# Patient Record
Sex: MALE | Race: BLACK OR AFRICAN AMERICAN | Employment: FULL TIME | URBAN - METROPOLITAN AREA
[De-identification: names, ages, dates, MRNs, and addresses within clinical notes are randomized per-mention and may not be internally consistent; named-entity substitution may affect disease eponyms.]

---

## 2021-08-26 ENCOUNTER — HOSPITAL ENCOUNTER (EMERGENCY)
Facility: CLINIC | Age: 21
Discharge: HOME | End: 2021-08-27
Attending: EMERGENCY MEDICINE | Admitting: EMERGENCY MEDICINE

## 2021-08-26 DIAGNOSIS — T40.2X1A OPIOID OVERDOSE, ACCIDENTAL OR UNINTENTIONAL, INITIAL ENCOUNTER (H): ICD-10-CM

## 2021-08-26 PROCEDURE — 99283 EMERGENCY DEPT VISIT LOW MDM: CPT

## 2021-08-26 ASSESSMENT — ENCOUNTER SYMPTOMS
HEADACHES: 0
ABDOMINAL PAIN: 0

## 2021-08-27 VITALS
TEMPERATURE: 99.3 F | OXYGEN SATURATION: 97 % | RESPIRATION RATE: 13 BRPM | HEART RATE: 54 BPM | DIASTOLIC BLOOD PRESSURE: 84 MMHG | SYSTOLIC BLOOD PRESSURE: 143 MMHG

## 2021-08-27 NOTE — PHARMACY-ADMISSION MEDICATION HISTORY
Pharmacy Medication History  Admission medication history interview status for the 8/26/2021  admission is complete. See EPIC admission navigator for prior to admission medications     Location of Interview: Phone  Medication history sources: Patient, Surescripts and Care Everywhere    Significant changes made to the medication list:  None    In the past week, patient estimated taking medication this percent of the time: greater than 90%    Additional medication history information:   Reported no prescription or OTC meds.    Medication reconciliation completed by provider prior to medication history? No    Time spent in this activity: 15 mins    Prior to Admission medications    Not on File       The information provided in this note is only as accurate as the sources available at the time of update(s)

## 2021-08-27 NOTE — ED TRIAGE NOTES
Pt was found sdleeping on the bathroom floor in a stall. Pt had a foil packet with unknown substance on it. Pt was given 12 mg of narcan. Pt was very combative but is calm at this time.

## 2021-08-27 NOTE — ED NOTES
Bed: [facility]  Expected date:   Expected time:   Means of arrival:   Comments:  When clean 535 overdose on hold

## 2021-08-27 NOTE — ED PROVIDER NOTES
History   Chief Complaint:  Drug Overdose     HPI   Edgar Bains is a 21 year old male who presents with drug overdose. Edgar states he snorted oxycodone prior to his flight. He was found in the airport bathroom stall unresponsive by a bystander. EMS was called and he received 12 mg intranasal Narcan. He awoke combative but calmed down. His flight was rescheduled for 0900 tomorrow morning and will hail a cab back to the airport. He has no concerns today such as headaches, chest pain, or abdominal pain. He denies suicidal ideation or intentional ingestion of other substances. He denies alcohol use.     Review of Systems   Cardiovascular: Negative for chest pain.   Gastrointestinal: Negative for abdominal pain.   Neurological: Negative for headaches.   Psychiatric/Behavioral: Negative for self-injury and suicidal ideas.   All other systems reviewed and are negative.    Allergies:  No known drug allergies    Medications:  The patient is not currently taking any prescribed medications.    Past Medical History:    The patient denies any significant past medical history aside from appendicitis which he has surgery performed for.     Past Surgical History:    Appendectomy    Social History:  Arrives to the emergency department via EMS from the airport.     Physical Exam     Patient Vitals for the past 24 hrs:   BP Temp Pulse Resp SpO2   08/26/21 2103 (!) 148/84 99.3  F (37.4  C) 72 16 100 %       Physical Exam    General:  Sitting on bed.   HENT:  No obvious trauma to head  Right Ear:  External ear normal.   Left Ear:  External ear normal.   Nose:  Nose normal.   Eyes:  Conjunctivae and EOM are normal. Pupils are equal, round, and reactive.   Neck: Normal range of motion. Neck supple. No tracheal deviation present.   CV:  Normal heart sounds. No murmur heard.  Pulm/Chest: Effort normal and breath sounds normal.   Abd: Soft. No distension. There is no tenderness. There is no rigidity, no rebound and no guarding.   M/S:  Normal range of motion.   Neuro: Alert. GCS 15.  Skin: Skin is warm and dry. No rash noted. Not diaphoretic.   Psych: Normal mood and affect. Behavior is normal.     Emergency Department Course     Emergency Department Course:    Reviewed:  I reviewed nursing notes and vitals    Assessments:  2107 I obtained history and examined the patient as noted above.     Disposition:  Care of the patient was transferred to my colleague Dr. Kevin pending monitoring and will leave for his flight in the morning.     Impression & Plan   Medical Decision Making:  Edgar Bains is a very pleasant 21 year old male who presents for evaluation of drug overdose. He admits to snorting oxycodone prior to his flight tonight.  He reports this was recreational and that it was not a suicide attempt.  He denies any other ingestion.  He denies any alcohol use.  He was found unresponsive to a bystander in the airport bathroom stall and EMS was called for support.  He is not cyanotic/was breathing on his own and no CPR was required.  He did not wake up for EMS so EMS administered 12 mg of intranasal narcan and the patient woke up. We will watch him on the cardiac monitor and pulse oximeter for four hours in case he becomes hypoxic again and would need more narcan.  The patient has a flight rescheduled for 9 AM tomorrow.  As long as he does not require any additional Narcan, or has any additional concerns later, I suspect he will be able to be discharged in the early morning to the airport to catch his flight to [place].  The patient is very cooperative and in agreement and grateful for this.  Care of the patient will be signed out to the night physician, Dr. Kevin.    Diagnosis:    ICD-10-CM    1. Opioid overdose, accidental or unintentional, initial encounter (H)  T40.2X1A      Scribe Disclosure:  I, Gadiel Bolanos, am serving as a scribe at 9:02 PM on 8/26/2021 to document services personally performed by Pierre Mcdaniels DO  based on my observations and the provider's statements to me.              Pierre Mcdaniels, DO  08/26/21 2127